# Patient Record
Sex: FEMALE
[De-identification: names, ages, dates, MRNs, and addresses within clinical notes are randomized per-mention and may not be internally consistent; named-entity substitution may affect disease eponyms.]

---

## 2017-10-03 PROBLEM — Z00.00 ENCOUNTER FOR PREVENTIVE HEALTH EXAMINATION: Status: ACTIVE | Noted: 2017-10-03

## 2017-10-13 LAB — HBA1C MFR BLD HPLC: 7.6

## 2018-10-30 ENCOUNTER — OTHER (OUTPATIENT)
Age: 73
End: 2018-10-30

## 2019-03-05 LAB — HBA1C MFR BLD HPLC: 6.5

## 2021-11-08 PROBLEM — I35.0 AORTIC STENOSIS: Status: ACTIVE | Noted: 2021-11-08

## 2021-11-08 PROBLEM — Z86.79 HISTORY OF ATRIAL FIBRILLATION: Status: RESOLVED | Noted: 2021-11-08 | Resolved: 2021-11-08

## 2021-11-08 PROBLEM — I10 BENIGN ESSENTIAL HYPERTENSION: Status: RESOLVED | Noted: 2021-11-08 | Resolved: 2021-11-08

## 2021-11-09 ENCOUNTER — APPOINTMENT (OUTPATIENT)
Dept: CARDIOTHORACIC SURGERY | Facility: CLINIC | Age: 76
End: 2021-11-09
Payer: MEDICARE

## 2021-11-09 VITALS
WEIGHT: 179 LBS | RESPIRATION RATE: 16 BRPM | HEIGHT: 60 IN | SYSTOLIC BLOOD PRESSURE: 182 MMHG | OXYGEN SATURATION: 99 % | DIASTOLIC BLOOD PRESSURE: 75 MMHG | HEART RATE: 89 BPM | BODY MASS INDEX: 35.14 KG/M2

## 2021-11-09 DIAGNOSIS — Z78.9 OTHER SPECIFIED HEALTH STATUS: ICD-10-CM

## 2021-11-09 DIAGNOSIS — Z86.79 PERSONAL HISTORY OF OTHER DISEASES OF THE CIRCULATORY SYSTEM: ICD-10-CM

## 2021-11-09 DIAGNOSIS — Z86.39 PERSONAL HISTORY OF OTHER ENDOCRINE, NUTRITIONAL AND METABOLIC DISEASE: ICD-10-CM

## 2021-11-09 DIAGNOSIS — Z82.49 FAMILY HISTORY OF ISCHEMIC HEART DISEASE AND OTHER DISEASES OF THE CIRCULATORY SYSTEM: ICD-10-CM

## 2021-11-09 DIAGNOSIS — I10 ESSENTIAL (PRIMARY) HYPERTENSION: ICD-10-CM

## 2021-11-09 DIAGNOSIS — I35.0 NONRHEUMATIC AORTIC (VALVE) STENOSIS: ICD-10-CM

## 2021-11-09 PROCEDURE — 99204 OFFICE O/P NEW MOD 45 MIN: CPT

## 2021-11-09 RX ORDER — DILTIAZEM HYDROCHLORIDE 120 MG/1
120 CAPSULE, EXTENDED RELEASE ORAL
Refills: 0 | Status: ACTIVE | COMMUNITY

## 2021-11-09 RX ORDER — APIXABAN 5 MG/1
5 TABLET, FILM COATED ORAL
Refills: 0 | Status: ACTIVE | COMMUNITY

## 2021-11-12 NOTE — ASSESSMENT
[FreeTextEntry1] : There is some degree of aortic stenosis as well as mitral valve regurgitation. I would like additional imaging obtained prior to recommending any intervention. Will require additional imaging for Mitral Valve Regurgitation and Aortic Stenosis prior to any intervention.\par \par PLAN:\par - Transesophageal Echocardiogram at [facility] \par - Cardiac Catheterization (Dr Alanis) at [facility] \par - Return to care after imaging to discuss results\par \par \par \par \par \par \par I, Mundo Castillo NP am scribing for and in the presence of Dr. Wilson the following sections HISTORY OF PRESENT ILLNESS, PAST MEDICAL/FAMILY/SOCIAL HISTORY; REVIEW OF SYSTEMS; VITAL SIGNS; PHYSICAL EXAM; DISPOSITION.\par \par "I personally performed the services described in the documentation, reviewed the documentation recorded by the scribe in my presence and accurately and completely records my words and actions."\par

## 2021-11-12 NOTE — DATA REVIEWED
[FreeTextEntry1] : Transthoracic Echocardiogram from 4/29/21 at Dr Mccracken's Office\par - LVEF normal\par - The aortic valve shows mild calcification with moderate restriction. Mild aortic stenosis\par - The mitral valve shows mild thickening restriction and MAC. Moderate to severe mitral regurgitation. There is mild mitral valve stenosis.

## 2021-11-12 NOTE — PHYSICAL EXAM
[General Appearance - Well Nourished] : well nourished [General Appearance - Well Developed] : well developed [Sclera] : the sclera and conjunctiva were normal [Outer Ear] : the ears and nose were normal in appearance [Jugular Venous Distention Increased] : there was no jugular-venous distention [Neck Appearance] : the appearance of the neck was normal [Respiration, Rhythm And Depth] : normal respiratory rhythm and effort [Auscultation Breath Sounds / Voice Sounds] : lungs were clear to auscultation bilaterally [Heart Rate And Rhythm] : heart rate was normal and rhythm regular [Examination Of The Chest] : the chest was normal in appearance [2+] : left 2+ [Abnormal Walk] : normal gait [Skin Color & Pigmentation] : normal skin color and pigmentation [Sensation] : the sensory exam was normal to light touch and pinprick [Motor Exam] : the motor exam was normal [Oriented To Time, Place, And Person] : oriented to person, place, and time [Impaired Insight] : insight and judgment were intact [FreeTextEntry1] : NYHAC II   Grade 2/6

## 2021-11-12 NOTE — HISTORY OF PRESENT ILLNESS
[FreeTextEntry1] : Ms. CORBETT is a 76 year old female referred by Dr. Mccracken who presents for consultation. Her past medical history includes diabetes, atrial fibrillation (Eliquis), HTN, vertigo, aortic stenosis, mitral regurgitation. \par \par COVID 19 in March 2020. She began having palpitations in June and was evaluated by Cardiology noted to have atrial fibrillation. She then started Eliquis.\par \par She is now feeling well however gets shortness of breath easily, She developed ankle swelling after being prescribed a medication from cardiology and has since resolved since. She pays close attention to her eating habits with salt and sugars.

## 2021-11-12 NOTE — CONSULT LETTER
[Dear  ___] : Dear  [unfilled], [Consult Letter:] : I had the pleasure of evaluating your patient, [unfilled]. [Please see my note below.] : Please see my note below. [Consult Closing:] : Thank you very much for allowing me to participate in the care of this patient.  If you have any questions, please do not hesitate to contact me. [Sincerely,] : Sincerely, [FreeTextEntry2] : Elieser Mccracken MD [FreeTextEntry3] : Elieser Wilson MD\par  of Cardiothoracic Surgery\par [facility]\par [address] \[place] [address]\par [phone]\par

## 2021-11-12 NOTE — REVIEW OF SYSTEMS
[Feeling Tired] : feeling tired [Palpitations] : palpitations [SOB on Exertion] : shortness of breath during exertion [Negative] : Heme/Lymph [Feeling Poorly] : not feeling poorly [Chest Pain] : no chest pain [Lower Ext Edema] : no lower extremity edema [Shortness Of Breath] : no shortness of breath [Orthopnea] : no orthopnea